# Patient Record
Sex: FEMALE | Race: WHITE | NOT HISPANIC OR LATINO
[De-identification: names, ages, dates, MRNs, and addresses within clinical notes are randomized per-mention and may not be internally consistent; named-entity substitution may affect disease eponyms.]

---

## 2019-01-18 ENCOUNTER — FORM ENCOUNTER (OUTPATIENT)
Age: 54
End: 2019-01-18

## 2019-12-15 ENCOUNTER — FORM ENCOUNTER (OUTPATIENT)
Age: 54
End: 2019-12-15

## 2019-12-21 ENCOUNTER — FORM ENCOUNTER (OUTPATIENT)
Age: 54
End: 2019-12-21

## 2020-01-19 ENCOUNTER — FORM ENCOUNTER (OUTPATIENT)
Age: 55
End: 2020-01-19

## 2020-02-07 ENCOUNTER — FORM ENCOUNTER (OUTPATIENT)
Age: 55
End: 2020-02-07

## 2020-03-16 ENCOUNTER — FORM ENCOUNTER (OUTPATIENT)
Age: 55
End: 2020-03-16

## 2020-04-05 ENCOUNTER — FORM ENCOUNTER (OUTPATIENT)
Age: 55
End: 2020-04-05

## 2020-08-16 ENCOUNTER — FORM ENCOUNTER (OUTPATIENT)
Age: 55
End: 2020-08-16

## 2020-09-07 ENCOUNTER — FORM ENCOUNTER (OUTPATIENT)
Age: 55
End: 2020-09-07

## 2020-09-18 ENCOUNTER — FORM ENCOUNTER (OUTPATIENT)
Age: 55
End: 2020-09-18

## 2020-12-19 ENCOUNTER — FORM ENCOUNTER (OUTPATIENT)
Age: 55
End: 2020-12-19

## 2020-12-20 ENCOUNTER — FORM ENCOUNTER (OUTPATIENT)
Age: 55
End: 2020-12-20

## 2021-01-21 ENCOUNTER — FORM ENCOUNTER (OUTPATIENT)
Age: 56
End: 2021-01-21

## 2021-12-21 PROBLEM — Z00.00 ENCOUNTER FOR PREVENTIVE HEALTH EXAMINATION: Status: ACTIVE | Noted: 2021-12-21

## 2021-12-22 ENCOUNTER — NON-APPOINTMENT (OUTPATIENT)
Age: 56
End: 2021-12-22

## 2021-12-30 ENCOUNTER — APPOINTMENT (OUTPATIENT)
Dept: OBGYN | Facility: CLINIC | Age: 56
End: 2021-12-30
Payer: COMMERCIAL

## 2021-12-30 VITALS
TEMPERATURE: 97.9 F | BODY MASS INDEX: 35.41 KG/M2 | WEIGHT: 190 LBS | HEIGHT: 61.5 IN | HEART RATE: 89 BPM | DIASTOLIC BLOOD PRESSURE: 83 MMHG | SYSTOLIC BLOOD PRESSURE: 129 MMHG

## 2021-12-30 PROCEDURE — 99396 PREV VISIT EST AGE 40-64: CPT

## 2021-12-30 NOTE — HISTORY OF PRESENT ILLNESS
[FreeTextEntry1] : ANNUAL VISIT\par \par LAST CORBY VISIT:\par \par Patient\par Name	TYLOR GRANADOS (54yo, F) ID# [id number]	Appt. Date/Time	2020 05:20PM\par 	1965	Service Dept.	[facility]K OFFICE\par Provider	LONNIE JOSEPH MD\par Insurance	\par Med Primary: [employer]\par Insurance # : [id number]\par Policy/Group # : [id number]\par Prescription: [facility] - Member is eligible. details\par Chief Complaint\par Well Woman Visit\par Patient's Care Team\par Primary Care Provider: MICHELLE MCKENZIE MD: [address] [place] [address], Ph [phone], Fax [phone] NPI: [id number]\par Patient's Pharmacies\par [facility] ([facility]): [address] [address], Ph [phone], Fax [phone]\par Vitals\par 2020 05:44 pm\par Ht:	5 ft 1 in\par Wt:	195 lbs\par BMI:	36.8\par BP:	126/80 sitting\par Allergies\par Reviewed Allergies\par NKDA\par Medications\par Reviewed Medications\par acetaminophen 120 mg-codeine 12 mg/5 mL oral solution\par 19   filled	[facility]\par amoxicillin 875 mg-potassium clavulanate 125 mg tablet\par 14   filled	[facility]\par azithromycin 250 mg tablet\par Take 2 tablet(s) every day by oral route for 1 day.\par 20   filled	[facility]\par Bevespi Aerosphere 9 mcg-4.8 mcg HFA aerosol inhaler\par 17   filled	[facility]\par Carac 0.5 % topical cream\par 14   filled	[facility]\par cefUROXime axetiL 500 mg tablet\par 02/26/15   filled	[facility]\par Cheratussin AC 10 mg-100 mg/5 mL oral liquid\par 03/14/15   filled	[facility]\par ciprofloxacin 500 mg tablet\par 19   filled	[facility]\par clindamycin  mg capsule\par 20   filled	[facility]\par doxycycline hyclate 100 mg tablet\par 14   filled	[facility]\par Dulera 200 mcg-5 mcg/actuation HFA aerosol inhaler\par 17   filled	[facility]\par erythromycin 5 mg/gram (0.5 %) eye ointment\par 20   filled	[facility]\par famotidine 40 mg/5 mL (8 mg/mL) oral suspension\par 19   filled	[facility]\par fluocinonide 0.05 % topical cream\par 10/08/20   filled	[facility]\par fluticasone propionate 50 mcg/actuation nasal spray,suspension\par 14   filled	[facility]\par hydrocortisone 2.5 % topical cream\par 14   filled	[facility]\par ibuprofen 800 mg tablet\par 13   filled	[facility]\par levoFLOXacin 500 mg tablet\par 03/09/15   filled	[facility]\par methylPREDNISolone 4 mg tablets in a dose pack\par 19   filled	[facility]\par montelukast 10 mg tablet\par 08/17/15   filled	[facility]\par omeprazole 40 mg capsule,delayed release\par 19   filled	[facility]\par phentermine 37.5 mg tablet\par 10/03/20   filled	[facility]\par ProAir HFA 90 mcg/actuation aerosol inhaler\par 16   filled	[facility]\par promethazine 6.25 mg-codeine 10 mg/5 mL syrup\par 20   filled	[facility]\par Promethazine VC-Codeine 6.25 mg-5 mg-10 mg/5 mL oral syrup\par 17   filled	[facility]\par Suprep Bowel Prep Kit 17.5 gram-3.13 gram-1.6 gram oral solution\par 16   filled	[facility]\par ursodioL 300 mg capsule\par 19   filled	[facility]\par valACYclovir 1 gram tablet\par 14   filled	[facility]\par Problems\par Reviewed Problems\par Pedunculated leiomyoma of uterus - Onset: 2020 - found on mri not an ovarian cyst\par Amenorrhea\par Menopausal symptom\par Upper respiratory infection\par Irregular periods\par Gynecologic examination\par Family History\par Reviewed Family History\par Mother	- Problem ( age: 48)\par - LEUKEMIA (previously recorded as Other)\par Maternal Grandfather	- Malignant neoplastic disease\par - BRAIN (previously recorded as Cancer)\par Maternal Aunt	- Malignant tumor of breast\par - previously recorded as Breast Cancer\par Maternal Aunt	- Malignant tumor of breast\par - previously recorded as Breast Cancer\par Father	- Carcinoma of prostate\par Sister	- Migraine ( age: 45)\par Social History\par Reviewed Social History\par Tobacco Smoking Status: Never smoker\par Most Recent Tobacco Use Screenin2020\par Surgical History\par Surgical History not reviewed (last reviewed 2020)\par Myomectomy abdom method\par Tonsillectomy\par GYN History\par Reviewed GYN History\par LMP: Definite.\par Date of LMP: 2013.\par Obstetric History\par Reviewed Obstetric History\par TOTAL	FULL	PRE	AB. I	AB. S	ECTOPICS	MULTIPLE	LIVING\par 0							\par Past Medical History\par Past Medical History not reviewed (last reviewed 2019)\par Notes: basal cell ca skin\par Screening\par None recorded.\par Physical Exam\par Patient is a 55-year-old female.\par \par Assessment / Plan\par 1. Gynecologic examination -\par mammo /sono\par \par Z01.411: Encounter for gynecological examination (general) (routine) with abnormal findings\par PAP, LB\par MAMMO, SCREENING, BILATERAL\par \par 2. 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 Mammography assessment (Category 3) - Probably benign finding, short interval follow-up -\par and mri:\par \par rpeat sono/mammo and fu at [facility]\par \par R92.2: Inconclusive mammogram\par \par US, TRANSVAGINAL\par \par Review of us, transvaginal taken on 2020 at [facility]K OFFICE shows:\par Imaging Studies:\par Indications: ovarian cyst.\par Uterus: volume (cc): 13.1.\par Endometrium: thickness 3 mm.\par Cervix: normal.\par Cul de sac: no fluid was demonstrated.\par Right Ovary: volume (cc): 1.3.\par Left Ovary: volume (cc): 1.4; no cyst or fibroid noted.\par \par Return to Office\par Lonnie Joseph MD for Well-Woman Visit at [facility] BK OFFICE on 2021 at 03:40 PM\par Encounter Sign-Off\par Encounter signed-off by Lonnie Joseph MD, 2020.\par Encounter performed and documented by Lonnie Joseph MD\par Encounter reviewed & signed by Lonnie Joseph MD on 2020 at 6:13pm\par Audit history\par

## 2022-01-07 LAB
CYTOLOGY CVX/VAG DOC THIN PREP: NORMAL
HPV HIGH+LOW RISK DNA PNL CVX: NOT DETECTED

## 2023-03-13 ENCOUNTER — APPOINTMENT (OUTPATIENT)
Dept: OBGYN | Facility: CLINIC | Age: 58
End: 2023-03-13
Payer: COMMERCIAL

## 2023-03-13 VITALS
HEIGHT: 61 IN | DIASTOLIC BLOOD PRESSURE: 80 MMHG | WEIGHT: 185 LBS | SYSTOLIC BLOOD PRESSURE: 126 MMHG | BODY MASS INDEX: 34.93 KG/M2 | HEART RATE: 76 BPM

## 2023-03-13 PROCEDURE — 76830 TRANSVAGINAL US NON-OB: CPT

## 2023-03-13 PROCEDURE — 99396 PREV VISIT EST AGE 40-64: CPT | Mod: 25

## 2023-03-19 LAB
CYTOLOGY CVX/VAG DOC THIN PREP: NORMAL
HPV HIGH+LOW RISK DNA PNL CVX: NOT DETECTED

## 2024-05-12 ENCOUNTER — LABORATORY RESULT (OUTPATIENT)
Age: 59
End: 2024-05-12

## 2024-05-13 ENCOUNTER — APPOINTMENT (OUTPATIENT)
Dept: OBGYN | Facility: CLINIC | Age: 59
End: 2024-05-13
Payer: COMMERCIAL

## 2024-05-13 VITALS
BODY MASS INDEX: 35.87 KG/M2 | DIASTOLIC BLOOD PRESSURE: 86 MMHG | HEART RATE: 88 BPM | WEIGHT: 190 LBS | SYSTOLIC BLOOD PRESSURE: 138 MMHG | HEIGHT: 61 IN

## 2024-05-13 DIAGNOSIS — Z01.419 ENCOUNTER FOR GYNECOLOGICAL EXAMINATION (GENERAL) (ROUTINE) W/OUT ABNORMAL FINDINGS: ICD-10-CM

## 2024-05-13 DIAGNOSIS — N85.9 NONINFLAMMATORY DISORDER OF UTERUS, UNSPECIFIED: ICD-10-CM

## 2024-05-13 DIAGNOSIS — Z78.9 OTHER SPECIFIED HEALTH STATUS: ICD-10-CM

## 2024-05-13 LAB
BILIRUB UR QL STRIP: NORMAL
CLARITY UR: CLEAR
COLLECTION METHOD: NORMAL
GLUCOSE UR-MCNC: NORMAL
HCG UR QL: 0.2 EU/DL
HGB UR QL STRIP.AUTO: NORMAL
KETONES UR-MCNC: NORMAL
LEUKOCYTE ESTERASE UR QL STRIP: NORMAL
NITRITE UR QL STRIP: NORMAL
PH UR STRIP: 5.5
PROT UR STRIP-MCNC: NORMAL
SP GR UR STRIP: 1

## 2024-05-13 PROCEDURE — 99459 PELVIC EXAMINATION: CPT

## 2024-05-13 PROCEDURE — 99396 PREV VISIT EST AGE 40-64: CPT

## 2024-05-13 PROCEDURE — 81003 URINALYSIS AUTO W/O SCOPE: CPT | Mod: QW

## 2024-05-13 PROCEDURE — 76830 TRANSVAGINAL US NON-OB: CPT

## 2024-05-13 NOTE — PROCEDURE
[Transvaginal Ultrasound] : transvaginal ultrasound [FreeTextEntry3] : fluid in the uterus is gone all ok cervix normal [FreeTextEntry5] : 11.2 cc vol, 2.5 mm [FreeTextEntry7] : 0.5 cc [FreeTextEntry8] : 0.6 cc

## 2024-05-13 NOTE — PHYSICAL EXAM
[Chaperone Present] : A chaperone was present in the examining room during all aspects of the physical examination [[id number]] : A chaperone was present during the pelvic exam. [Examination Of The Breasts] : a normal appearance [No Masses] : no breast masses were palpable [Labia Majora] : normal [Labia Minora] : normal [Normal] : normal [Uterine Adnexae] : normal

## 2025-02-21 ENCOUNTER — NON-APPOINTMENT (OUTPATIENT)
Age: 60
End: 2025-02-21

## 2025-05-13 DIAGNOSIS — Z12.39 ENCOUNTER FOR OTHER SCREENING FOR MALIGNANT NEOPLASM OF BREAST: ICD-10-CM

## 2025-05-19 ENCOUNTER — APPOINTMENT (OUTPATIENT)
Dept: OBGYN | Facility: CLINIC | Age: 60
End: 2025-05-19
Payer: COMMERCIAL

## 2025-05-19 VITALS
DIASTOLIC BLOOD PRESSURE: 89 MMHG | WEIGHT: 220 LBS | HEART RATE: 79 BPM | BODY MASS INDEX: 41.54 KG/M2 | SYSTOLIC BLOOD PRESSURE: 136 MMHG | HEIGHT: 61 IN

## 2025-05-19 DIAGNOSIS — Z01.419 ENCOUNTER FOR GYNECOLOGICAL EXAMINATION (GENERAL) (ROUTINE) W/OUT ABNORMAL FINDINGS: ICD-10-CM

## 2025-05-19 PROCEDURE — 99459 PELVIC EXAMINATION: CPT

## 2025-05-19 PROCEDURE — 99386 PREV VISIT NEW AGE 40-64: CPT

## 2025-05-19 PROCEDURE — 99396 PREV VISIT EST AGE 40-64: CPT

## 2025-05-19 NOTE — PHYSICAL EXAM
[Chaperoned Physical Exam] : A chaperone was present in the examining room during all aspects of the physical examination. [MA] : MA [Examination Of The Breasts] : a normal appearance [No Masses] : no breast masses were palpable [Labia Majora] : normal [Labia Minora] : normal [Normal] : normal [Uterine Adnexae] : normal [FreeTextEntry2] : JENNIFER

## 2025-05-23 LAB — HPV HIGH+LOW RISK DNA PNL CVX: NOT DETECTED
